# Patient Record
Sex: FEMALE | Race: BLACK OR AFRICAN AMERICAN | NOT HISPANIC OR LATINO | Employment: OTHER | URBAN - METROPOLITAN AREA
[De-identification: names, ages, dates, MRNs, and addresses within clinical notes are randomized per-mention and may not be internally consistent; named-entity substitution may affect disease eponyms.]

---

## 2018-05-04 ENCOUNTER — TELEPHONE (OUTPATIENT)
Dept: INTERNAL MEDICINE | Facility: CLINIC | Age: 78
End: 2018-05-04

## 2018-05-04 NOTE — TELEPHONE ENCOUNTER
Returned call to pt in regards refill request on [facility]. Advised that we did receive the request however  is unable to approve it as pt has not been seen since 2015, offered to schedule an appt for pt but she declined and said that she will come into urgent care. DAVID

## 2018-07-19 ENCOUNTER — LAB VISIT (OUTPATIENT)
Dept: LAB | Facility: HOSPITAL | Age: 78
End: 2018-07-19
Attending: FAMILY MEDICINE
Payer: MEDICARE

## 2018-07-19 ENCOUNTER — OFFICE VISIT (OUTPATIENT)
Dept: INTERNAL MEDICINE | Facility: CLINIC | Age: 78
End: 2018-07-19
Payer: MEDICARE

## 2018-07-19 VITALS
OXYGEN SATURATION: 94 % | DIASTOLIC BLOOD PRESSURE: 65 MMHG | BODY MASS INDEX: 21.01 KG/M2 | WEIGHT: 126.13 LBS | SYSTOLIC BLOOD PRESSURE: 130 MMHG | HEIGHT: 65 IN | HEART RATE: 79 BPM | TEMPERATURE: 96 F

## 2018-07-19 DIAGNOSIS — I71.40 ABDOMINAL AORTIC ANEURYSM WITHOUT RUPTURE: Chronic | ICD-10-CM

## 2018-07-19 DIAGNOSIS — I70.213 ATHEROSCLEROSIS OF NATIVE ARTERY OF BOTH LOWER EXTREMITIES WITH INTERMITTENT CLAUDICATION: ICD-10-CM

## 2018-07-19 DIAGNOSIS — I73.9 VASCULAR CLAUDICATION: ICD-10-CM

## 2018-07-19 DIAGNOSIS — Z98.61 H/O CORONARY ANGIOPLASTY: Chronic | ICD-10-CM

## 2018-07-19 DIAGNOSIS — I70.233 ATHEROSCLEROSIS OF NATIVE ARTERY OF RIGHT LOWER EXTREMITY WITH ULCERATION OF ANKLE: Primary | Chronic | ICD-10-CM

## 2018-07-19 DIAGNOSIS — D32.1 BENIGN NEOPLASM OF SPINAL MENINGES: ICD-10-CM

## 2018-07-19 DIAGNOSIS — I70.233 ATHEROSCLEROSIS OF NATIVE ARTERY OF RIGHT LOWER EXTREMITY WITH ULCERATION OF ANKLE: Chronic | ICD-10-CM

## 2018-07-19 DIAGNOSIS — I25.810 CORONARY ARTERY DISEASE INVOLVING CORONARY BYPASS GRAFT OF NATIVE HEART WITHOUT ANGINA PECTORIS: Chronic | ICD-10-CM

## 2018-07-19 DIAGNOSIS — J43.8 OTHER EMPHYSEMA: Chronic | ICD-10-CM

## 2018-07-19 PROBLEM — I70.25 ATHEROSCLEROSIS OF NATIVE ARTERY OF EXTREMITY WITH ULCERATION: Chronic | Status: ACTIVE | Noted: 2018-07-19

## 2018-07-19 PROBLEM — I70.25 ATHEROSCLEROSIS OF NATIVE ARTERY OF EXTREMITY WITH ULCERATION: Status: ACTIVE | Noted: 2018-07-19

## 2018-07-19 LAB — ERYTHROCYTE [SEDIMENTATION RATE] IN BLOOD BY WESTERGREN METHOD: 4 MM/HR

## 2018-07-19 PROCEDURE — 99999 PR PBB SHADOW E&M-EST. PATIENT-LVL IV: CPT | Mod: PBBFAC,,, | Performed by: FAMILY MEDICINE

## 2018-07-19 PROCEDURE — 80053 COMPREHEN METABOLIC PANEL: CPT

## 2018-07-19 PROCEDURE — 99499 UNLISTED E&M SERVICE: CPT | Mod: S$GLB,,, | Performed by: FAMILY MEDICINE

## 2018-07-19 PROCEDURE — 3078F DIAST BP <80 MM HG: CPT | Mod: CPTII,S$GLB,, | Performed by: FAMILY MEDICINE

## 2018-07-19 PROCEDURE — 85025 COMPLETE CBC W/AUTO DIFF WBC: CPT

## 2018-07-19 PROCEDURE — 99215 OFFICE O/P EST HI 40 MIN: CPT | Mod: S$GLB,,, | Performed by: FAMILY MEDICINE

## 2018-07-19 PROCEDURE — 36415 COLL VENOUS BLD VENIPUNCTURE: CPT | Mod: PO

## 2018-07-19 PROCEDURE — 99499 UNLISTED E&M SERVICE: CPT | Mod: ,,, | Performed by: FAMILY MEDICINE

## 2018-07-19 PROCEDURE — 3075F SYST BP GE 130 - 139MM HG: CPT | Mod: CPTII,S$GLB,, | Performed by: FAMILY MEDICINE

## 2018-07-19 PROCEDURE — 85651 RBC SED RATE NONAUTOMATED: CPT | Mod: PO

## 2018-07-19 RX ORDER — SIMVASTATIN 20 MG/1
20 TABLET, FILM COATED ORAL NIGHTLY
COMMUNITY

## 2018-07-19 RX ORDER — HYDROCODONE BITARTRATE AND ACETAMINOPHEN 5; 325 MG/1; MG/1
1 TABLET ORAL 2 TIMES DAILY PRN
Qty: 14 TABLET | Refills: 0 | Status: SHIPPED | OUTPATIENT
Start: 2018-07-19

## 2018-07-19 RX ORDER — ASPIRIN 81 MG/1
81 TABLET ORAL DAILY
COMMUNITY

## 2018-07-19 NOTE — PROGRESS NOTES
CHIEF COMPLAINT  Leg Pain (RLE)    This is my first time treating her here. All problems addressed today are NEW TO ME.     HISTORY OF PRESENT ILLNESS    PROBLEM/CONDITION: Primary complaint is pain. LOCATION is a lateral malleolus of right ankle. ONSET over the last couple of months. QUALITY described as aching pain. SEVERITY described as MODERATELY SEVERE at worst. TIMING described as constant, gradually worsening. Symptoms occur in the CONTEXT of known peripheral atherosclerotic disease with history of previous ischemic ulcer over the lateral malleolus of right ankle, which required treatment at a wound clinic. On exam today she has physical findings consistent with chronic recurrent ulcer at that site, currently completely covered with eschar. it is not hypothermic or integrated, but she is very tender to palpation of her lateral malleolus, giving concern for osteomyelitis. Her feet are cool, and pedal pulses are not palpable. No other ulceration identified. I reviewed her chart, finding prior arterial evaluation including CT aorta with runoff, showing diffuse atherosclerotic disease with small aortic aneurysm. further questioning reveals calf pain with ambulation, but no pain at rest. We discussed differential diagnosis. It was agreed to proceed with treatment as ordered.    PROBLEM/CONDITION: She has known coronary artery disease, status post angioplasty, and she says that she falls regularly with her cardiologist, Dr. Lea.  Her coronary artery disease appears stable. She reports no angina or angina equivalent symptoms.    PROBLEM/CONDITION: She continues to smoke, pre-contemplative regarding smoking cessation, and she has chronic mild hypoxia presumably due to her emphysema. She says she does not have a pulmonologist.  Her emphysema appears stable.    No other complaints or concerns reported.    Problem List Items Addressed This Visit        Pulmonary    Other emphysema (Chronic)    Relevant Orders     Ambulatory Referral to Pulmonology       Cardiac/Vascular    Atherosclerosis of native artery of extremity with ulceration (lateral right ankle) - Primary    Overview     CTA AORTOILIOFEMORAL RUNOFF (Sep 24 2010)  (1) Atherosclerotic changes of the abdominal aorta, particularly the infrarenal segment, with aneurysmal dilatation distally measured at 3.4 cm in the ap axis. No evidence of perianeurysmal leakage.  (2) No significant atherosclerotic changes or stenosis of the celiac trunk, sma, or renal arteries.  (3) Diffuse atherosclerotic changes of the runoff vessels with no foci of stenosis greater than 50% identified along the common iliac, external iliac, common femoral, or superficial femoral segments. There does appear to be 70-80 percent diameter stenosis of the distal left popliteal artery. Small vessel changes noted below the level of the knees as described above, left greater than right.         Relevant Medications    HYDROcodone-acetaminophen (NORCO) 5-325 mg per tablet    Other Relevant Orders    COMPREHENSIVE METABOLIC PANEL    CBC auto differential    Sedimentation rate    MRI Ankle W WO Contrast Right    Ambulatory Referral to Vascular Surgery    Coronary artery disease involving coronary bypass graft of native heart without angina pectoris (Chronic)    Abdominal aortic aneurysm without rupture (Chronic)    Overview     CTA AORTOILIOFEMORAL RUNOFF (Sep 24 2010)  (1) Atherosclerotic changes of the abdominal aorta, particularly the infrarenal segment, with aneurysmal dilatation distally measured at 3.4 cm in the ap axis. No evidence of perianeurysmal leakage.  (2) No significant atherosclerotic changes or stenosis of the celiac trunk, sma, or renal arteries.  (3) Diffuse atherosclerotic changes of the runoff vessels with no foci of stenosis greater than 50% identified along the common iliac, external iliac, common femoral, or superficial femoral segments. There does appear to be 70-80 percent diameter  stenosis of the distal left popliteal artery. Small vessel changes noted below the level of the knees as described above, left greater than right.         Relevant Orders    COMPREHENSIVE METABOLIC PANEL    CBC auto differential    Sedimentation rate    Ambulatory Referral to Vascular Surgery    H/O coronary angioplasty (Chronic)       Oncology    RESOLVED: Benign neoplasm of spinal meninges    Overview     Thoracic Spine w/wo Contrast, 11/11/2015 ([facility])    COMPARISON: 11/6/2015    HISTORY:post op tumor resection    TECHNIQUE: Multiplanar, multisequence MR imaging of the thoracic spine was performed before and after IV administration of 14 mL Omniscan.    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 There is expected enhancement at the operative site in the epidural space and paraspinal soft tissues. Spinal canal is mildly narrowed related to the edematous changes in the epidural space but the focal cord compression has resolved. There is a focus of T2 hyperintense signal at the T4-5 disc level within the cord which may be residual edema versus myelomalacia in the area of prior cord compression. The cord is no longer compressed.     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 There are expected postoperative changes with resolution of cord compression with small focus of T2 hyperintense signal within the cord at T4-5 which may be mild residual edema or myelomalacia from compression.          Current Assessment & Plan     This condition appears to be RESOLVED.           Other Visit Diagnoses     Vascular claudication        Relevant Orders    CTA Runoff ABD PEL Bilat Lower Ext    Ambulatory Referral to Vascular Surgery    Atherosclerosis of native artery of both lower extremities with intermittent claudication        Relevant Orders     "Ambulatory Referral to Vascular Surgery          PAST MEDICAL HISTORY, FAMILY HISTORY and SOCIAL HISTORY reviewed by me (CHICHI Morris MD) and are updated consistent with the patient's report.    CURRENT MEDICATION LIST, and ALLERGY LIST reviewed by me (CHICHI Morris MD) and are updated consistent with the patient's report as follows:    Outpatient Medications Prior to Visit   Medication Sig Dispense Refill    clopidogrel (PLAVIX) 75 mg tablet Take 75 mg by mouth once daily.       hydrochlorothiazide (HYDRODIURIL) 25 MG tablet Take 1 tablet (25 mg total) by mouth once daily. 90 tablet 0    metoprolol succinate (TOPROL-XL) 50 MG 24 hr tablet Take 1 tablet (50 mg total) by mouth once daily. 90 tablet 0    ADVAIR DISKUS 250-50 mcg/dose diskus inhaler INHALE ONE PUFF INTO LUNGS TWICE DAILY 1 each 6    alendronate (FOSAMAX) 70 MG tablet Take 1 tablet (70 mg total) by mouth every 7 days. 4 tablet 11    ergocalciferol (ERGOCALCIFEROL) 50,000 unit Cap Take 1 capsule (50,000 Units total) by mouth every 7 days. 10 capsule 0    benazepril (LOTENSIN) 10 MG tablet Take 1 tablet (10 mg total) by mouth once daily. 90 tablet 0    LIVALO 2 mg Tab tablet Take 2 mg by mouth once daily.        No facility-administered medications prior to visit.        Allergies as of 07/19/2018 - Reviewed 07/19/2018   Allergen Reaction Noted    Ace inhibitors Swelling 07/19/2018    Benazepril Swelling 11/02/2017       REVIEW OF SYSTEMS  CONSTITUTIONAL: No fever reported.  CARDIOVASCULAR: No chest pain reported.  PULMONARY: No trouble breathing reported.   HEMATOLOGIC: No blood clots or bleeding problems reported.     PHYSICAL EXAM  Vitals:    07/19/18 1522   BP: 130/65   BP Location: Right arm   Patient Position: Sitting   BP Method: Medium (Manual)   Pulse: 79   Temp: 96.3 °F (35.7 °C)   TempSrc: Tympanic   SpO2: (!) 94%   Weight: 57.2 kg (126 lb 1.7 oz)   Height: 5' 5" (1.651 m)     CONSTITUTIONAL: Vital signs noted. No apparent " distress. Does not appear acutely ill or septic. Appears adequately hydrated.  HEENT: External ENT grossly unremarkable. Hearing grossly intact. Oropharynx moist.  PULM: Lungs clear. Breathing unlabored. Air movement is limited, but apparently adequate and stable for her.  HEART: Auscultation reveals regular rate and rhythm without murmur, gallop or rub. Description of relevant exam of this system is documented above in HPI.   DERM: Skin warm and moist with normal turgor. Description of relevant exam of this system is documented above in HPI. (See accompanying clinical photo.)   NEURO: There are no gross focal motor deficits or gross deficits of cranial nerves III-XII.  PSYCHIATRIC: Alert and oriented x 3. Mood is grossly neutral. Affect appropriate. Judgment and insight not grossly compromised.  MUSCULOSKELETAL: Ambulates independently. Calves are nontender, reasonably symmetric.          ASSESSMENT and PLAN  Atherosclerosis of native artery of right lower extremity with ulceration of ankle  -     COMPREHENSIVE METABOLIC PANEL; Future; Expected date: 07/19/2018  -     CBC auto differential; Future; Expected date: 07/19/2018  -     Sedimentation rate; Future; Expected date: 07/19/2018  -     MRI Ankle W WO Contrast Right; Future; Expected date: 07/19/2018  -     Ambulatory Referral to Vascular Surgery  -     HYDROcodone-acetaminophen (NORCO) 5-325 mg per tablet; Take 1 tablet by mouth 2 (two) times daily as needed for Pain.  Dispense: 14 tablet; Refill: 0    Other emphysema  -     Ambulatory Referral to Pulmonology    Abdominal aortic aneurysm without rupture  -     COMPREHENSIVE METABOLIC PANEL; Future; Expected date: 07/19/2018  -     CBC auto differential; Future; Expected date: 07/19/2018  -     Sedimentation rate; Future; Expected date: 07/19/2018  -     Ambulatory Referral to Vascular Surgery    Vascular claudication  -     CTA Runoff ABD PEL Bilat Lower Ext; Future; Expected date: 07/19/2018  -     Ambulatory  "Referral to Vascular Surgery    Atherosclerosis of native artery of both lower extremities with intermittent claudication  -     Ambulatory Referral to Vascular Surgery    Benign neoplasm of spinal meninges    H/O coronary angioplasty    Coronary artery disease involving coronary bypass graft of native heart without angina pectoris        PRESCRIPTION MEDICATION MANAGEMENT  Except as noted below, CURRENT MEDICATIONS are to remain unchanged from that listed above.    Medications Ordered This Encounter      HYDROcodone-acetaminophen (NORCO) 5-325 mg per tablet          Sig: Take 1 tablet by mouth 2 (two) times daily as needed for Pain.          Dispense:  14 tablet          Refill:  0  Medications Discontinued During This Encounter   Medication Reason    benazepril (LOTENSIN) 10 MG tablet Allergic response    LIVALO 2 mg Tab tablet Discontinued by another clinician       Follow-up in about 1 week (around 7/26/2018) for review test results and discuss treatment plan.    There are no Patient Instructions on file for this visit.    TOTAL TIME evaluating and managing this patient for this encounter exceeded 40 minutes, the majority spent counseling and coordinating care for the listed diagnoses.     ABOUT THIS DOCUMENTATION:  · The order of the conditions listed in the HPI is one of convenience and does not necessarily reflect the chronology of the appointment, nor the relative importance of a condition.  · Documentation entered by me for this encounter was done in part using speech-recognition technology. Although I have made an effort to ensure accuracy, "sound like" errors may exist and should be interpreted in context.                        -CHICHI Morris MD     "

## 2018-07-20 LAB
ALBUMIN SERPL BCP-MCNC: 3.8 G/DL
ALP SERPL-CCNC: 57 U/L
ALT SERPL W/O P-5'-P-CCNC: 15 U/L
ANION GAP SERPL CALC-SCNC: 11 MMOL/L
AST SERPL-CCNC: 22 U/L
BASOPHILS # BLD AUTO: 0.03 K/UL
BASOPHILS NFR BLD: 0.7 %
BILIRUB SERPL-MCNC: 0.7 MG/DL
BUN SERPL-MCNC: 18 MG/DL
CALCIUM SERPL-MCNC: 9.8 MG/DL
CHLORIDE SERPL-SCNC: 97 MMOL/L
CO2 SERPL-SCNC: 31 MMOL/L
CREAT SERPL-MCNC: 0.7 MG/DL
DIFFERENTIAL METHOD: ABNORMAL
EOSINOPHIL # BLD AUTO: 0.2 K/UL
EOSINOPHIL NFR BLD: 4 %
ERYTHROCYTE [DISTWIDTH] IN BLOOD BY AUTOMATED COUNT: 14.7 %
EST. GFR  (AFRICAN AMERICAN): >60 ML/MIN/1.73 M^2
EST. GFR  (NON AFRICAN AMERICAN): >60 ML/MIN/1.73 M^2
GLUCOSE SERPL-MCNC: 79 MG/DL
HCT VFR BLD AUTO: 44.7 %
HGB BLD-MCNC: 14.6 G/DL
IMM GRANULOCYTES # BLD AUTO: 0 K/UL
IMM GRANULOCYTES NFR BLD AUTO: 0 %
LYMPHOCYTES # BLD AUTO: 1.7 K/UL
LYMPHOCYTES NFR BLD: 41.6 %
MCH RBC QN AUTO: 29.3 PG
MCHC RBC AUTO-ENTMCNC: 32.7 G/DL
MCV RBC AUTO: 90 FL
MONOCYTES # BLD AUTO: 0.5 K/UL
MONOCYTES NFR BLD: 12.6 %
NEUTROPHILS # BLD AUTO: 1.7 K/UL
NEUTROPHILS NFR BLD: 41.1 %
NRBC BLD-RTO: 0 /100 WBC
PLATELET # BLD AUTO: 233 K/UL
PMV BLD AUTO: 9.7 FL
POTASSIUM SERPL-SCNC: 3.5 MMOL/L
PROT SERPL-MCNC: 7.1 G/DL
RBC # BLD AUTO: 4.99 M/UL
SODIUM SERPL-SCNC: 139 MMOL/L
WBC # BLD AUTO: 4.04 K/UL

## 2018-07-25 ENCOUNTER — TELEPHONE (OUTPATIENT)
Dept: RADIOLOGY | Facility: HOSPITAL | Age: 78
End: 2018-07-25

## 2018-07-25 ENCOUNTER — TELEPHONE (OUTPATIENT)
Dept: INTERNAL MEDICINE | Facility: CLINIC | Age: 78
End: 2018-07-25

## 2018-07-25 NOTE — TELEPHONE ENCOUNTER
----- Message from Emerita Patino, RT sent at 7/25/2018 10:23 AM CDT -----  Contact: Radiology  The provider requested for this patient to get a MRI, and patient just informed us that she has stents, and we need that information about the stents to determine if she can go through the MRI machine. Patient said her cardiology provider is Dr Sebastián Simmons.   If you have any questions call radiology at ext [phone]    Patient is scheduled tomorrow for MRI, if we don't receive the information we will have to postpone MRI until your office obtains information.  Thanks.

## 2018-07-25 NOTE — TELEPHONE ENCOUNTER
Per Emerita, radiologist needs as much information about patient's stents from Dr. Simmons's office as possible. She advised me to contact his office and have them fax information to radiology at [phone].

## 2018-07-25 NOTE — PROGRESS NOTES
I reported her results to her via letter dated today and routed it to my staff pool.    TASK: Please print and mail the letter. No further action is required unless you feel the patient would want/need to receive a phone call in addition to the letter.    Thanks!      CHICHI Morris MD  --------------------------------------------------------------------------------     PATIENT CONTACT INFORMATION  Home Phone      [phone]  Work Phone      Not on file.  [place]          [phone]    --------------------------------------------------------------------------------     Vidya's future appointments include:  7/26/2018  10:15 AM   [facility] MRI2                  [facility] MRI       [place]  7/26/2018  11:10 AM   [facility] CT1 LIMIT 500 LBS     [facility] CT SCAN   [place]  7/31/2018  9:00 AM    Uma Rodriguez NP      [facility] PULMSVC   [place]  7/31/2018  10:00 AM   CHICHI Morris MD      [facility] IM        [place]  8/22/2018  8:20 AM    oRdney Burton MD       [facility]  --------------------------------------------------------------------------------

## 2018-07-25 NOTE — TELEPHONE ENCOUNTER
Requested stent information from Dorina at Dr. Sebastián Simmons's office on this patient and let her know patient's MRI is tomorrow. Provided Dorina with our radiology department's fax number ([phone]) and Emerita's name for attention. She repeated and verbalized understanding. She stated she would send over all information they have on patient's stents.

## 2019-07-15 ENCOUNTER — PES CALL (OUTPATIENT)
Dept: ADMINISTRATIVE | Facility: CLINIC | Age: 79
End: 2019-07-15

## 2019-09-26 ENCOUNTER — PES CALL (OUTPATIENT)
Dept: ADMINISTRATIVE | Facility: CLINIC | Age: 79
End: 2019-09-26

## 2019-11-13 ENCOUNTER — PES CALL (OUTPATIENT)
Dept: ADMINISTRATIVE | Facility: CLINIC | Age: 79
End: 2019-11-13